# Patient Record
Sex: FEMALE | Race: WHITE | Employment: UNEMPLOYED | URBAN - METROPOLITAN AREA
[De-identification: names, ages, dates, MRNs, and addresses within clinical notes are randomized per-mention and may not be internally consistent; named-entity substitution may affect disease eponyms.]

---

## 2017-03-14 ENCOUNTER — OFFICE VISIT (OUTPATIENT)
Dept: SURGERY | Facility: CLINIC | Age: 52
End: 2017-03-14

## 2017-03-14 VITALS
WEIGHT: 162.63 LBS | HEIGHT: 62 IN | SYSTOLIC BLOOD PRESSURE: 130 MMHG | HEART RATE: 94 BPM | RESPIRATION RATE: 20 BRPM | BODY MASS INDEX: 29.93 KG/M2 | DIASTOLIC BLOOD PRESSURE: 77 MMHG

## 2017-03-14 DIAGNOSIS — E66.3 OVERWEIGHT (BMI 25.0-29.9): ICD-10-CM

## 2017-03-14 DIAGNOSIS — E78.2 MIXED HYPERCHOLESTEROLEMIA AND HYPERTRIGLYCERIDEMIA: ICD-10-CM

## 2017-03-14 DIAGNOSIS — F43.9 STRESS: ICD-10-CM

## 2017-03-14 DIAGNOSIS — R60.0 BILATERAL EDEMA OF LOWER EXTREMITY: ICD-10-CM

## 2017-03-14 DIAGNOSIS — R12 HEART BURN: Primary | ICD-10-CM

## 2017-03-14 DIAGNOSIS — E55.9 VITAMIN D DEFICIENCY: ICD-10-CM

## 2017-03-14 DIAGNOSIS — G47.33 OSA (OBSTRUCTIVE SLEEP APNEA): ICD-10-CM

## 2017-03-14 PROCEDURE — 99203 OFFICE O/P NEW LOW 30 MIN: CPT | Performed by: INTERNAL MEDICINE

## 2017-03-14 RX ORDER — HYDROCHLOROTHIAZIDE 12.5 MG/1
12.5 CAPSULE, GELATIN COATED ORAL DAILY
Qty: 30 CAPSULE | Refills: 1 | Status: SHIPPED | OUTPATIENT
Start: 2017-03-14 | End: 2017-09-21

## 2017-03-14 NOTE — PROGRESS NOTES
[facility] Consultation Note       Date of Consult:  3/14/2017    Patient:  Harriet Champion  :      [birth year]  MRN:      [id number]    Referring Provider: Dr. Beltran ref.  provider found       Chief Complaint:  Patient presents with: mg by mouth daily.  Disp:  Rfl:        Allergies:  Penicillins     Comorbidities:  GERD    Social History:      Social History   Marital Status:   Spouse Name: N/A    Years of Education: N/A  Number of Children: N/A     Occupational History  None on were reviewed and are negative.     Physical Exam:  General appearance: alert, appears stated age and cooperative  Head: Normocephalic, without obvious abnormality, atraumatic  Lungs: clear to auscultation bilaterally  Heart: S1, S2 normal, no murmur, click per day. 4. Increase fruit and vegetable servings to 5-6 per day. LENNOX: will start hctz 12.5 mg    Binge eating:   Will start vyvanse 20 mg  Eats when not hungry  Guilty feeling afterwards    Needs ekg        [unfilled]

## 2017-09-21 ENCOUNTER — OFFICE VISIT (OUTPATIENT)
Dept: SURGERY | Facility: CLINIC | Age: 52
End: 2017-09-21

## 2017-09-21 VITALS
SYSTOLIC BLOOD PRESSURE: 123 MMHG | RESPIRATION RATE: 16 BRPM | HEIGHT: 62 IN | WEIGHT: 169.31 LBS | BODY MASS INDEX: 31.16 KG/M2 | HEART RATE: 114 BPM | OXYGEN SATURATION: 95 % | DIASTOLIC BLOOD PRESSURE: 74 MMHG

## 2017-09-21 DIAGNOSIS — E55.9 VITAMIN D DEFICIENCY: ICD-10-CM

## 2017-09-21 DIAGNOSIS — R12 HEART BURN: ICD-10-CM

## 2017-09-21 DIAGNOSIS — E66.3 OVERWEIGHT (BMI 25.0-29.9): ICD-10-CM

## 2017-09-21 DIAGNOSIS — G47.33 OSA (OBSTRUCTIVE SLEEP APNEA): ICD-10-CM

## 2017-09-21 DIAGNOSIS — F41.9 ANXIETY: ICD-10-CM

## 2017-09-21 DIAGNOSIS — F43.9 STRESS: ICD-10-CM

## 2017-09-21 DIAGNOSIS — E78.2 MIXED HYPERCHOLESTEROLEMIA AND HYPERTRIGLYCERIDEMIA: Primary | ICD-10-CM

## 2017-09-21 PROCEDURE — 99214 OFFICE O/P EST MOD 30 MIN: CPT | Performed by: INTERNAL MEDICINE

## 2017-09-21 RX ORDER — BUPROPION HYDROCHLORIDE 150 MG/1
TABLET ORAL
Refills: 2 | COMMUNITY
Start: 2017-09-08 | End: 2020-07-07

## 2017-09-21 NOTE — PROGRESS NOTES
[address], [place] [address] [facility] 91 [facility] [address]  Dept: [phone]     Date:   2017    Patient:  Chandan Salvador  :      89  MRN:      [id number]    Chief Complain Marital status:   Spouse name: N/A    Years of education: N/A  Number of children: N/A     Occupational History  None on file     Social History Main Topics   Smoking status: Not on file    Smokeless tobacco: Not on file    Alcohol use Not on file Psychiatric/Behavioral: The patient is nervous/anxious and is hyperactive. Physical Exam:   Physical Exam   Constitutional: She is oriented to person, place, and time. She appears well-developed and well-nourished.    HENT:   Head: Normocephalic an anti-reflux medications. Patient is anxious- she states that she took a double dose of Wellbutrin on Wednesday and has been anxious ever since. Patient states that she wants to wean off wellbutrin.     Patient is not interested in medications- she did n

## 2017-11-02 ENCOUNTER — OFFICE VISIT (OUTPATIENT)
Dept: SURGERY | Facility: CLINIC | Age: 52
End: 2017-11-02

## 2017-11-02 VITALS
SYSTOLIC BLOOD PRESSURE: 107 MMHG | WEIGHT: 165.31 LBS | HEIGHT: 62 IN | OXYGEN SATURATION: 95 % | DIASTOLIC BLOOD PRESSURE: 67 MMHG | BODY MASS INDEX: 30.42 KG/M2 | HEART RATE: 84 BPM | RESPIRATION RATE: 16 BRPM

## 2017-11-02 DIAGNOSIS — R12 HEART BURN: ICD-10-CM

## 2017-11-02 DIAGNOSIS — G47.33 OSA (OBSTRUCTIVE SLEEP APNEA): ICD-10-CM

## 2017-11-02 DIAGNOSIS — E66.3 OVERWEIGHT (BMI 25.0-29.9): ICD-10-CM

## 2017-11-02 DIAGNOSIS — Z51.81 ENCOUNTER FOR THERAPEUTIC DRUG MONITORING: ICD-10-CM

## 2017-11-02 DIAGNOSIS — K76.0 FATTY LIVER: ICD-10-CM

## 2017-11-02 DIAGNOSIS — E78.2 MIXED HYPERCHOLESTEROLEMIA AND HYPERTRIGLYCERIDEMIA: Primary | ICD-10-CM

## 2017-11-02 DIAGNOSIS — E55.9 VITAMIN D DEFICIENCY: ICD-10-CM

## 2017-11-02 DIAGNOSIS — R73.03 PRE-DIABETES: ICD-10-CM

## 2017-11-02 PROCEDURE — 99214 OFFICE O/P EST MOD 30 MIN: CPT | Performed by: INTERNAL MEDICINE

## 2017-11-02 RX ORDER — MONTELUKAST SODIUM 10 MG/1
10 TABLET ORAL
COMMUNITY
Start: 2017-10-30 | End: 2018-02-08

## 2017-11-02 RX ORDER — LACTOBACIL 2/BIFIDO 1/S.THERMO 450B CELL
1 PACKET (EA) ORAL DAILY
Qty: 30 CAPSULE | Refills: 2 | Status: SHIPPED | OUTPATIENT
Start: 2017-11-02 | End: 2018-02-08

## 2017-11-02 NOTE — PROGRESS NOTES
[address] [place] [address] Sarah  [facility] 91 [facility]   Dept: [phone]     Date:   2017    Patient:  Marj Barber  :        MRN:      [id number]    Chief Complain units Oral Cap Take 5,000 Units by mouth daily. Disp:  Rfl: 5   Amitriptyline HCl 10 MG Oral Tab Take 10 mg by mouth daily. Disp:  Rfl: 0   aspirin 81 MG Oral Chew Tab Chew 81 mg by mouth daily.  Disp:  Rfl:      Allergies:  Penicillins     Social History: manage cues    Exercise Goals Reviewed and Discussed    Walk for  45 Minutes    ROS:    Review of Systems   Constitutional: Negative. HENT: Negative. Respiratory: Negative. Cardiovascular: Negative. Gastrointestinal: Negative.     Frutoso Poll meal plan and medication. Liver function stable. Reviewed Lipid panel- cholesterol and triglycerides are increased    GERD: The patient believes her reflux is somewhat better of at least no worse on current medication.  She was encouraged to avoid caffei

## 2017-12-08 ENCOUNTER — OFFICE VISIT (OUTPATIENT)
Dept: SURGERY | Facility: CLINIC | Age: 52
End: 2017-12-08

## 2017-12-08 VITALS
OXYGEN SATURATION: 96 % | RESPIRATION RATE: 18 BRPM | HEIGHT: 62 IN | SYSTOLIC BLOOD PRESSURE: 121 MMHG | HEART RATE: 86 BPM | DIASTOLIC BLOOD PRESSURE: 59 MMHG | BODY MASS INDEX: 29.01 KG/M2 | TEMPERATURE: 98 F | WEIGHT: 157.63 LBS

## 2017-12-08 DIAGNOSIS — E66.3 OVERWEIGHT (BMI 25.0-29.9): ICD-10-CM

## 2017-12-08 DIAGNOSIS — F43.9 STRESS: ICD-10-CM

## 2017-12-08 DIAGNOSIS — E78.2 MIXED HYPERCHOLESTEROLEMIA AND HYPERTRIGLYCERIDEMIA: Primary | ICD-10-CM

## 2017-12-08 DIAGNOSIS — K76.0 FATTY LIVER: ICD-10-CM

## 2017-12-08 DIAGNOSIS — G47.33 OSA (OBSTRUCTIVE SLEEP APNEA): ICD-10-CM

## 2017-12-08 DIAGNOSIS — Z51.81 ENCOUNTER FOR THERAPEUTIC DRUG MONITORING: ICD-10-CM

## 2017-12-08 PROCEDURE — 99214 OFFICE O/P EST MOD 30 MIN: CPT | Performed by: INTERNAL MEDICINE

## 2017-12-08 NOTE — PROGRESS NOTES
[address], [facility]  181 [facility] 91 [facility] [address]  Dept: [phone]     Date:   2017    Patient:  Christopher Tiwari  :      6697  MRN:      [id number]    Chief Complain (VITAMIN D3) 5000 units Oral Cap Take 5,000 Units by mouth daily. Disp:  Rfl: 5   Amitriptyline HCl 10 MG Oral Tab Take 10 mg by mouth daily. Disp:  Rfl: 0   aspirin 81 MG Oral Chew Tab Chew 81 mg by mouth daily.  Disp:  Rfl:      Allergies:  Penicillins for eating and manage cues    Exercise Goals Reviewed and Discussed    Walk for  45 Minutes    ROS:    Review of Systems   Constitutional: Negative. HENT: Negative. Respiratory: Negative. Cardiovascular: Negative. Gastrointestinal: Negative. medication. Liver function stable. Reviewed Lipid panel- cholesterol and triglycerides are increased    GERD: The patient believes her reflux is somewhat better of at least no worse on current medication.  She was encouraged to avoid caffeine products, e

## 2018-02-08 ENCOUNTER — OFFICE VISIT (OUTPATIENT)
Dept: SURGERY | Facility: CLINIC | Age: 53
End: 2018-02-08

## 2018-02-08 VITALS
OXYGEN SATURATION: 96 % | DIASTOLIC BLOOD PRESSURE: 71 MMHG | HEART RATE: 87 BPM | SYSTOLIC BLOOD PRESSURE: 121 MMHG | HEIGHT: 62 IN | BODY MASS INDEX: 28.53 KG/M2 | WEIGHT: 155.06 LBS | RESPIRATION RATE: 16 BRPM

## 2018-02-08 DIAGNOSIS — E55.9 VITAMIN D DEFICIENCY: ICD-10-CM

## 2018-02-08 DIAGNOSIS — Z51.81 ENCOUNTER FOR THERAPEUTIC DRUG MONITORING: ICD-10-CM

## 2018-02-08 DIAGNOSIS — R73.03 PRE-DIABETES: ICD-10-CM

## 2018-02-08 DIAGNOSIS — E78.2 MIXED HYPERCHOLESTEROLEMIA AND HYPERTRIGLYCERIDEMIA: Primary | ICD-10-CM

## 2018-02-08 DIAGNOSIS — G47.33 OSA (OBSTRUCTIVE SLEEP APNEA): ICD-10-CM

## 2018-02-08 DIAGNOSIS — K76.0 FATTY LIVER: ICD-10-CM

## 2018-02-08 DIAGNOSIS — E66.3 OVERWEIGHT (BMI 25.0-29.9): ICD-10-CM

## 2018-02-08 PROCEDURE — 99214 OFFICE O/P EST MOD 30 MIN: CPT | Performed by: INTERNAL MEDICINE

## 2018-02-08 RX ORDER — LACTOBACIL 2/BIFIDO 1/S.THERMO 450B CELL
1 PACKET (EA) ORAL DAILY
Qty: 90 CAPSULE | Refills: 2 | Status: SHIPPED | OUTPATIENT
Start: 2018-02-08 | End: 2019-02-24

## 2018-02-08 NOTE — PROGRESS NOTES
[address], [address] 91 [facility] [address]  Dept: [phone]     Date:   2017    Patient:  Yolanda Moya  :      2410  MRN:      [id number]    Chief Complain of education: N/A  Number of children: N/A     Occupational History  None on file     Social History Main Topics   Smoking status: Never Smoker    Smokeless tobacco: Never Used    Alcohol use Not on file    Drug use: No    Sexual activity: Not on file Physical Exam:   Physical Exam   Constitutional: She is oriented to person, place, and time. She appears well-developed and well-nourished. HENT:   Head: Normocephalic and atraumatic. Neck: Normal range of motion. Neck supple.    Cardiovascular: N caffeine products, elevated head of bed and not eat for 1 hour before bedtime. No interval changes were made in her anti-reflux medications. NON SUICIDAL DEPRESSION: Mood stable on current medication. No changes initiated on this visit.     Patient is n

## 2018-02-15 ENCOUNTER — LAB ENCOUNTER (OUTPATIENT)
Dept: LAB | Age: 53
End: 2018-02-15
Attending: INTERNAL MEDICINE
Payer: COMMERCIAL

## 2018-02-15 DIAGNOSIS — G47.33 OSA (OBSTRUCTIVE SLEEP APNEA): ICD-10-CM

## 2018-02-15 DIAGNOSIS — R73.03 PRE-DIABETES: ICD-10-CM

## 2018-02-15 DIAGNOSIS — E66.3 OVERWEIGHT (BMI 25.0-29.9): ICD-10-CM

## 2018-02-15 DIAGNOSIS — E55.9 VITAMIN D DEFICIENCY: ICD-10-CM

## 2018-02-15 DIAGNOSIS — Z51.81 ENCOUNTER FOR THERAPEUTIC DRUG MONITORING: ICD-10-CM

## 2018-02-15 DIAGNOSIS — K76.0 FATTY LIVER: ICD-10-CM

## 2018-02-15 DIAGNOSIS — E78.2 MIXED HYPERCHOLESTEROLEMIA AND HYPERTRIGLYCERIDEMIA: ICD-10-CM

## 2018-02-15 LAB
ALBUMIN SERPL BCP-MCNC: 4.3 G/DL (ref 3.5–4.8)
ALBUMIN/GLOB SERPL: 1.3 {RATIO} (ref 1–2)
ALP SERPL-CCNC: 73 U/L (ref 32–100)
ALT SERPL-CCNC: 102 U/L (ref 14–54)
ANION GAP SERPL CALC-SCNC: 9 MMOL/L (ref 0–18)
AST SERPL-CCNC: 55 U/L (ref 15–41)
BASOPHILS # BLD: 0 K/UL (ref 0–0.2)
BASOPHILS NFR BLD: 1 %
BILIRUB SERPL-MCNC: 0.4 MG/DL (ref 0.3–1.2)
BUN SERPL-MCNC: 17 MG/DL (ref 8–20)
BUN/CREAT SERPL: 21.8 (ref 10–20)
CALCIUM SERPL-MCNC: 9.4 MG/DL (ref 8.5–10.5)
CHLORIDE SERPL-SCNC: 101 MMOL/L (ref 95–110)
CHOLEST SERPL-MCNC: 216 MG/DL (ref 110–200)
CO2 SERPL-SCNC: 27 MMOL/L (ref 22–32)
CREAT SERPL-MCNC: 0.78 MG/DL (ref 0.5–1.5)
EOSINOPHIL # BLD: 0.1 K/UL (ref 0–0.7)
EOSINOPHIL NFR BLD: 1 %
ERYTHROCYTE [DISTWIDTH] IN BLOOD BY AUTOMATED COUNT: 14 % (ref 11–15)
GLOBULIN PLAS-MCNC: 3.4 G/DL (ref 2.5–3.7)
GLUCOSE SERPL-MCNC: 101 MG/DL (ref 70–99)
HCT VFR BLD AUTO: 39.4 % (ref 35–48)
HDLC SERPL-MCNC: 42 MG/DL
HGB BLD-MCNC: 13.3 G/DL (ref 12–16)
LDLC SERPL CALC-MCNC: 138 MG/DL (ref 0–99)
LYMPHOCYTES # BLD: 2.4 K/UL (ref 1–4)
LYMPHOCYTES NFR BLD: 34 %
MCH RBC QN AUTO: 27.3 PG (ref 27–32)
MCHC RBC AUTO-ENTMCNC: 33.7 G/DL (ref 32–37)
MCV RBC AUTO: 80.8 FL (ref 80–100)
MONOCYTES # BLD: 0.6 K/UL (ref 0–1)
MONOCYTES NFR BLD: 8 %
NEUTROPHILS # BLD AUTO: 4 K/UL (ref 1.8–7.7)
NEUTROPHILS NFR BLD: 56 %
NONHDLC SERPL-MCNC: 174 MG/DL
OSMOLALITY UR CALC.SUM OF ELEC: 286 MOSM/KG (ref 275–295)
PATIENT FASTING: YES
PLATELET # BLD AUTO: 193 K/UL (ref 140–400)
PMV BLD AUTO: 7.8 FL (ref 7.4–10.3)
POTASSIUM SERPL-SCNC: 4.3 MMOL/L (ref 3.3–5.1)
PROT SERPL-MCNC: 7.7 G/DL (ref 5.9–8.4)
RBC # BLD AUTO: 4.88 M/UL (ref 3.7–5.4)
SODIUM SERPL-SCNC: 137 MMOL/L (ref 136–144)
TRIGL SERPL-MCNC: 182 MG/DL (ref 1–149)
TSH SERPL-ACNC: 1.98 UIU/ML (ref 0.45–5.33)
VIT B12 SERPL-MCNC: 535 PG/ML (ref 181–914)
WBC # BLD AUTO: 7.1 K/UL (ref 4–11)

## 2018-02-15 PROCEDURE — 83036 HEMOGLOBIN GLYCOSYLATED A1C: CPT

## 2018-02-15 PROCEDURE — 82607 VITAMIN B-12: CPT

## 2018-02-15 PROCEDURE — 36415 COLL VENOUS BLD VENIPUNCTURE: CPT

## 2018-02-15 PROCEDURE — 80061 LIPID PANEL: CPT

## 2018-02-15 PROCEDURE — 82306 VITAMIN D 25 HYDROXY: CPT

## 2018-02-15 PROCEDURE — 84443 ASSAY THYROID STIM HORMONE: CPT

## 2018-02-15 PROCEDURE — 85025 COMPLETE CBC W/AUTO DIFF WBC: CPT

## 2018-02-15 PROCEDURE — 80053 COMPREHEN METABOLIC PANEL: CPT

## 2018-02-16 LAB
25(OH)D3 SERPL-MCNC: 73.6 NG/ML
HBA1C MFR BLD: 5.7 % (ref 4–6)

## 2018-02-20 ENCOUNTER — TELEPHONE (OUTPATIENT)
Dept: SURGERY | Facility: CLINIC | Age: 53
End: 2018-02-20

## 2018-04-06 ENCOUNTER — OFFICE VISIT (OUTPATIENT)
Dept: SURGERY | Facility: CLINIC | Age: 53
End: 2018-04-06

## 2018-04-06 VITALS
OXYGEN SATURATION: 95 % | RESPIRATION RATE: 16 BRPM | HEART RATE: 81 BPM | WEIGHT: 155 LBS | DIASTOLIC BLOOD PRESSURE: 67 MMHG | BODY MASS INDEX: 28.52 KG/M2 | HEIGHT: 62 IN | SYSTOLIC BLOOD PRESSURE: 109 MMHG

## 2018-04-06 DIAGNOSIS — E78.2 MIXED HYPERCHOLESTEROLEMIA AND HYPERTRIGLYCERIDEMIA: Primary | ICD-10-CM

## 2018-04-06 DIAGNOSIS — R73.03 PRE-DIABETES: ICD-10-CM

## 2018-04-06 DIAGNOSIS — G47.33 OSA (OBSTRUCTIVE SLEEP APNEA): ICD-10-CM

## 2018-04-06 DIAGNOSIS — Z51.81 ENCOUNTER FOR THERAPEUTIC DRUG MONITORING: ICD-10-CM

## 2018-04-06 DIAGNOSIS — F43.9 STRESS: ICD-10-CM

## 2018-04-06 DIAGNOSIS — E66.3 OVERWEIGHT (BMI 25.0-29.9): ICD-10-CM

## 2018-04-06 PROCEDURE — 99214 OFFICE O/P EST MOD 30 MIN: CPT | Performed by: INTERNAL MEDICINE

## 2018-04-06 RX ORDER — TRAZODONE HYDROCHLORIDE 50 MG/1
50 TABLET ORAL NIGHTLY
COMMUNITY

## 2018-04-06 NOTE — PROGRESS NOTES
[address], [place] [address] Claire Smith  [facility] 91 [facility] [address]  Dept: [phone]     Date:   2017    Patient:  Gaviota Hook  :      [birth year]  MRN:      [id number]    Chief Complain status:   Spouse name: N/A    Years of education: N/A  Number of children: N/A     Occupational History  None on file     Social History Main Topics   Smoking status: Never Smoker    Smokeless tobacco: Never Used    Alcohol use Not on file    Drug u nervous/anxious. The patient is not hyperactive. Physical Exam:   Physical Exam   Constitutional: She is oriented to person, place, and time. She appears well-developed and well-nourished. HENT:   Head: Normocephalic and atraumatic.    Neck: Normal medications. NON SUICIDAL DEPRESSION: Mood stable on current medication. No changes initiated on this visit. Pre diabetes: a1C is 5.7. Aware she needs to cut back on carb intake    Goals for next month:  1. Keep a food log.   2. Drink 48-64 ounces

## 2018-04-18 ENCOUNTER — TELEPHONE (OUTPATIENT)
Dept: SURGERY | Facility: CLINIC | Age: 53
End: 2018-04-18

## 2018-04-18 NOTE — TELEPHONE ENCOUNTER
4/11/18 @ 10:38am Spoke to Vivian at [facility], [phone], TBW#[id number]. She verified that patient has following benefits for Bariatric services: [employer] (ALICE#[id number]) DX E66.3 Pt has benefits for Dietitian ([id number]/49920) no limit,  no prior auth reem’norman.  Carmencita [facility]

## 2018-04-26 ENCOUNTER — OFFICE VISIT (OUTPATIENT)
Dept: SURGERY | Facility: CLINIC | Age: 53
End: 2018-04-26

## 2018-04-26 VITALS — WEIGHT: 157.38 LBS | HEIGHT: 62 IN | BODY MASS INDEX: 28.96 KG/M2

## 2018-04-26 DIAGNOSIS — E66.3 OVERWEIGHT (BMI 25.0-29.9): ICD-10-CM

## 2018-04-26 PROCEDURE — 97802 MEDICAL NUTRITION INDIV IN: CPT | Performed by: DIETITIAN, REGISTERED

## 2018-04-26 NOTE — PROGRESS NOTES
[address] [place] [address] AND WEIGHT LOSS CLINIC  [address] [place] [address]  Dept: [phone]  Loc: [phone]    04/26/18    Bariatric Initial Nutrition Assessment    Juanita Mascorro is a 47year old female. constipation    Allergies:    Penicillins             Rash    Height:  Ht Readings from Last 1 Encounters:  04/26/18 : 5' 2\" (1.575 m)      Weight:  Wt Readings from Last 6 Encounters:  04/26/18 : 157 lb 6.4 oz  04/06/18 : 155 lb  02/08/18 : 155 lb 0.9 oz nightly., Disp: , Rfl:   •  Lisdexamfetamine Dimesylate (VYVANSE) 20 MG Oral Cap, Take 1 capsule (20 mg total) by mouth daily. , Disp: 30 capsule, Rfl: 0  •  [START ON 5/6/2018] Lisdexamfetamine Dimesylate (VYVANSE) 20 MG Oral Cap, Take 1 capsule (20 mg tot simple sugars  Inadequate in:  protein, vegetables and fiber   Trigger Foods: pop, popcorn  Patient currently consumes caffeine: more often than 3 times/week  Patient currently consumes soda: more often than 3 times/week    Activity Level: minimal and sede intake/choices  · Nutrition Rx  · Anthropometric measurements  · Body composition-InBody Testing at next appointment per MD recommendation  · Updated labs  · F/U MD plan of care  · F/U to reinforce goals  · F/U on vitamin/mineral supplementation  · Other:

## 2018-06-07 ENCOUNTER — OFFICE VISIT (OUTPATIENT)
Dept: SURGERY | Facility: CLINIC | Age: 53
End: 2018-06-07

## 2018-06-07 VITALS — BODY MASS INDEX: 29.5 KG/M2 | HEIGHT: 62 IN | WEIGHT: 160.31 LBS

## 2018-06-07 DIAGNOSIS — E66.3 OVERWEIGHT (BMI 25.0-29.9): ICD-10-CM

## 2018-06-07 PROCEDURE — 0358T BIA WHOLE BODY: CPT | Performed by: DIETITIAN, REGISTERED

## 2018-06-07 PROCEDURE — 97803 MED NUTRITION INDIV SUBSEQ: CPT | Performed by: DIETITIAN, REGISTERED

## 2018-06-07 NOTE — PROGRESS NOTES
[address]  [address] [address]  Dept: [phone]  Loc: [phone]    06/07/18      Bariatric Follow-up Nutrition Session    Sharri Mann is a 47year old female. (TRINTELLIX) 5 MG Oral Tab, Take 1 tablet by mouth daily. , Disp: , Rfl:   •  TraZODone HCl 50 MG Oral Tab, Take 50 mg by mouth nightly., Disp: , Rfl:   •  Probiotic Product (VSL#3) Oral Cap, Take 1 capsule by mouth daily. , Disp: 90 capsule, Rfl: 2  •  lans nutrition recommendations discussed last visit. Patient continues to struggle with inconsistent sleep schedule lending night eating. Not taking Vyvanse due to preexisting sleep disturbance.  Reports depressing home environment impedes any positive behavior

## 2018-07-24 ENCOUNTER — OFFICE VISIT (OUTPATIENT)
Dept: SURGERY | Facility: CLINIC | Age: 53
End: 2018-07-24

## 2018-07-24 VITALS — BODY MASS INDEX: 28.65 KG/M2 | WEIGHT: 155.69 LBS | HEIGHT: 62 IN

## 2018-07-24 DIAGNOSIS — E66.3 OVERWEIGHT (BMI 25.0-29.9): ICD-10-CM

## 2018-07-24 PROCEDURE — 97803 MED NUTRITION INDIV SUBSEQ: CPT | Performed by: DIETITIAN, REGISTERED

## 2018-07-24 NOTE — PROGRESS NOTES
[address]  [address] [address]  Dept: [phone]  Loc: [phone]    07/24/18      Bariatric Follow-up Nutrition Session    Leeanna Maloneya is a 47year old female. tablet by mouth daily. , Disp: , Rfl:   •  TraZODone HCl 50 MG Oral Tab, Take 50 mg by mouth nightly., Disp: , Rfl:   •  Probiotic Product (VSL#3) Oral Cap, Take 1 capsule by mouth daily. , Disp: 90 capsule, Rfl: 2  •  lansoprazole 30 MG Oral Capsule Delayed returning from trip to [facility] which pt attributes to frequently walking. Feeling better about restarting her lifestyle habits with more energy. Patient states her sleep habits have also improved.  Planning to grocery shop to  healthy foods for the tristin

## 2018-08-27 ENCOUNTER — TELEPHONE (OUTPATIENT)
Dept: SURGERY | Facility: CLINIC | Age: 53
End: 2018-08-27

## 2018-09-10 ENCOUNTER — OFFICE VISIT (OUTPATIENT)
Dept: SURGERY | Facility: CLINIC | Age: 53
End: 2018-09-10
Payer: COMMERCIAL

## 2018-09-10 VITALS
HEIGHT: 62 IN | WEIGHT: 151.06 LBS | RESPIRATION RATE: 18 BRPM | OXYGEN SATURATION: 98 % | HEART RATE: 79 BPM | DIASTOLIC BLOOD PRESSURE: 66 MMHG | SYSTOLIC BLOOD PRESSURE: 118 MMHG | BODY MASS INDEX: 27.8 KG/M2

## 2018-09-10 DIAGNOSIS — G47.33 OSA (OBSTRUCTIVE SLEEP APNEA): Primary | ICD-10-CM

## 2018-09-10 DIAGNOSIS — F43.9 STRESS: ICD-10-CM

## 2018-09-10 DIAGNOSIS — E66.3 OVERWEIGHT (BMI 25.0-29.9): ICD-10-CM

## 2018-09-10 DIAGNOSIS — E78.2 MIXED HYPERCHOLESTEROLEMIA AND HYPERTRIGLYCERIDEMIA: ICD-10-CM

## 2018-09-10 DIAGNOSIS — R73.03 PRE-DIABETES: ICD-10-CM

## 2018-09-10 DIAGNOSIS — R12 HEART BURN: ICD-10-CM

## 2018-09-10 DIAGNOSIS — Z51.81 ENCOUNTER FOR THERAPEUTIC DRUG MONITORING: ICD-10-CM

## 2018-09-10 PROCEDURE — 99214 OFFICE O/P EST MOD 30 MIN: CPT | Performed by: INTERNAL MEDICINE

## 2018-09-10 NOTE — PROGRESS NOTES
[address] [place] [address] Sarah  [facility] 91 [facility] [address]  Dept: [phone]     Date:   2017    Patient:  Harriet Champion  :        MRN:      [id number]    Chief Complain status:       Spouse name: Not on file      Number of children: Not on file      Years of education: Not on file      Highest education level: Not on file    Social Needs      Financial resource strain: Not on file      Food insecurity - worry: Not Identify triggers for eating and manage cues    Exercise Goals Reviewed and Discussed    Walk for  45 Minutes    ROS:    Review of Systems   Constitutional: Negative. HENT: Negative. Respiratory: Negative. Cardiovascular: Negative.     Gastrointest the above prescribed meal plan and medication. Liver function stable. Reviewed Lipid panel- cholesterol and triglycerides are increased    GERD: The patient believes her reflux is somewhat better of at least no worse on current medication.  She was encou

## 2018-10-04 ENCOUNTER — OFFICE VISIT (OUTPATIENT)
Dept: SURGERY | Facility: CLINIC | Age: 53
End: 2018-10-04
Payer: COMMERCIAL

## 2018-10-04 VITALS — BODY MASS INDEX: 28.09 KG/M2 | HEIGHT: 62 IN | WEIGHT: 152.63 LBS

## 2018-10-04 DIAGNOSIS — E66.3 OVERWEIGHT (BMI 25.0-29.9): Primary | ICD-10-CM

## 2018-10-04 DIAGNOSIS — R73.03 PRE-DIABETES: ICD-10-CM

## 2018-10-04 PROCEDURE — 97803 MED NUTRITION INDIV SUBSEQ: CPT | Performed by: DIETITIAN, REGISTERED

## 2018-10-04 NOTE — PROGRESS NOTES
[address] [facility] WE[facility]IN[facility]  [address] [place] [address]  Dept: [phone]  Loc: [phone]    10/04/18      Bariatric Follow-up Nutrition Session    Antonio Sandoval is a 47year old female. daily., Disp: 30 capsule, Rfl: 0  •  Vortioxetine HBr (TRINTELLIX) 5 MG Oral Tab, Take 1 tablet by mouth daily. , Disp: , Rfl:   •  TraZODone HCl 50 MG Oral Tab, Take 50 mg by mouth nightly., Disp: , Rfl:   •  Probiotic Product (VSL#3) Oral Cap, Take 1 caps be more structured. Describes some difficulties with family supporting healthy lifestyle so is now focusing more on herself. Used to do all cooking ([facility]) from scratch.  Needs to focus on consistent protein (non-lactose) and make moving more of a priorit

## 2019-02-25 RX ORDER — LACTOBACIL 2/BIFIDO 1/S.THERMO 450B CELL
1 PACKET (EA) ORAL DAILY
Qty: 90 CAPSULE | Refills: 0 | Status: SHIPPED | OUTPATIENT
Start: 2019-02-25 | End: 2020-07-07

## 2019-07-01 PROBLEM — G43.009 MIGRAINE WITHOUT AURA: Status: ACTIVE | Noted: 2019-03-06

## 2020-07-07 ENCOUNTER — OFFICE VISIT (OUTPATIENT)
Dept: INTERNAL MEDICINE CLINIC | Facility: CLINIC | Age: 55
End: 2020-07-07
Payer: COMMERCIAL

## 2020-07-07 ENCOUNTER — TELEPHONE (OUTPATIENT)
Dept: INTERNAL MEDICINE CLINIC | Facility: CLINIC | Age: 55
End: 2020-07-07

## 2020-07-07 VITALS
HEIGHT: 62 IN | RESPIRATION RATE: 18 BRPM | DIASTOLIC BLOOD PRESSURE: 81 MMHG | SYSTOLIC BLOOD PRESSURE: 126 MMHG | BODY MASS INDEX: 29.44 KG/M2 | WEIGHT: 160 LBS | HEART RATE: 84 BPM

## 2020-07-07 DIAGNOSIS — R73.03 PRE-DIABETES: ICD-10-CM

## 2020-07-07 DIAGNOSIS — G43.009 MIGRAINE WITHOUT AURA AND WITHOUT STATUS MIGRAINOSUS, NOT INTRACTABLE: ICD-10-CM

## 2020-07-07 DIAGNOSIS — K76.0 FATTY LIVER: ICD-10-CM

## 2020-07-07 DIAGNOSIS — Z00.00 ROUTINE PHYSICAL EXAMINATION: ICD-10-CM

## 2020-07-07 DIAGNOSIS — G93.49 LEUKOENCEPHALOPATHY: ICD-10-CM

## 2020-07-07 DIAGNOSIS — F43.23 ADJUSTMENT DISORDER WITH MIXED ANXIETY AND DEPRESSED MOOD: ICD-10-CM

## 2020-07-07 DIAGNOSIS — Z15.89 HETEROZYGOUS MTHFR MUTATION C677T: Primary | ICD-10-CM

## 2020-07-07 DIAGNOSIS — E55.9 VITAMIN D DEFICIENCY: ICD-10-CM

## 2020-07-07 DIAGNOSIS — E78.2 MIXED HYPERCHOLESTEROLEMIA AND HYPERTRIGLYCERIDEMIA: ICD-10-CM

## 2020-07-07 DIAGNOSIS — G47.33 OSA (OBSTRUCTIVE SLEEP APNEA): ICD-10-CM

## 2020-07-07 PROCEDURE — 3079F DIAST BP 80-89 MM HG: CPT | Performed by: INTERNAL MEDICINE

## 2020-07-07 PROCEDURE — 3074F SYST BP LT 130 MM HG: CPT | Performed by: INTERNAL MEDICINE

## 2020-07-07 PROCEDURE — 3008F BODY MASS INDEX DOCD: CPT | Performed by: INTERNAL MEDICINE

## 2020-07-07 PROCEDURE — 99205 OFFICE O/P NEW HI 60 MIN: CPT | Performed by: INTERNAL MEDICINE

## 2020-07-07 RX ORDER — BUSPIRONE HYDROCHLORIDE 5 MG/1
5 TABLET ORAL
COMMUNITY
Start: 2019-09-18

## 2020-07-07 NOTE — ASSESSMENT & PLAN NOTE
Ongoing issues with migraines. Stable at this time. Monitored by neurology.   Will be followed up after the next MRI is completed

## 2020-07-07 NOTE — PROGRESS NOTES
HPI:    Patient ID: Geovanna Manley is a 48year old female. New Patient, here to establish care.      Patient with a past medical history of nonrheumatic aortic valve insufficiency, SVT–most likely due to MAI migraine without aura, fatty liver, quynh without any speech or visual deficits. Significant problems with anxiety, depression related to dealing with young child who is currently about 12years old with multiple learning disabilities and psychiatric problems as well as delusions.   Current medi Allergic/Immunologic: Negative. Neurological: Negative. Negative for weakness. Hematological: Negative. Psychiatric/Behavioral: Positive for behavioral problems, decreased concentration, dysphoric mood and sleep disturbance.  The patient is nervo General: No tenderness or edema. Lymphadenopathy:     She has no cervical adenopathy. Neurological: She is alert and oriented to person, place, and time. She has normal reflexes. No cranial nerve deficit. She exhibits normal muscle tone.  Coordination n at this time. Monitored by neurology.   Will be followed up after the next MRI is completed         Leukoencephalopathy     Follow-up after the next MRI, labs as ordered         Relevant Orders    FOLIC ACID SERUM(FOLATE)    SED EVA HORN (AUTOMATED

## 2020-07-07 NOTE — TELEPHONE ENCOUNTER
Pt dropped off results for MD to review. Placed in AP's mailbox at [facility].

## 2020-07-07 NOTE — PATIENT INSTRUCTIONS
Problem List Items Addressed This Visit        Unprioritized    Adjustment disorder with mixed anxiety and depressed mood    Fatty liver     Fatty liver changes, new patient here, history of liver abnormalities. Recheck labs ordered.          Heterozygous

## 2020-07-07 NOTE — ASSESSMENT & PLAN NOTE
MAI on CPAP, has been using her CPAP for the past 1 year on a regular basis. History of SVT–has not been an issue recently, has completed Holter monitor as well as an event monitor per cardiology at [facility].

## 2020-07-31 ENCOUNTER — TELEPHONE (OUTPATIENT)
Dept: INTERNAL MEDICINE CLINIC | Facility: CLINIC | Age: 55
End: 2020-07-31

## 2020-08-10 NOTE — ASSESSMENT & PLAN NOTE
Has been seen by psychiatry and counselling at [facility]. Goal setting for small changes in outlook as discused. daily exercise discussed. Struggles with coping with daughter with learning disability and husbands psychiatric needs.

## 2021-05-20 ENCOUNTER — TELEPHONE (OUTPATIENT)
Dept: INTERNAL MEDICINE CLINIC | Facility: CLINIC | Age: 56
End: 2021-05-20

## 2021-05-20 NOTE — TELEPHONE ENCOUNTER
Please contact patient and assist on scheduling appt with Dr. Beatriz Nieto.  Pt is due for for mammogram.

## 2021-05-20 NOTE — TELEPHONE ENCOUNTER
Per patient she had her Mammogram already in [place], told patient to send us a copy and she understood.

## 2022-01-12 ENCOUNTER — HOSPITAL ENCOUNTER (OUTPATIENT)
Age: 57
Discharge: HOME | End: 2022-01-12
Payer: COMMERCIAL

## 2022-01-12 VITALS
SYSTOLIC BLOOD PRESSURE: 130 MMHG | RESPIRATION RATE: 18 BRPM | TEMPERATURE: 98 F | DIASTOLIC BLOOD PRESSURE: 75 MMHG | HEART RATE: 85 BPM | OXYGEN SATURATION: 98 %

## 2022-01-12 DIAGNOSIS — B97.89 VIRAL RESPIRATORY ILLNESS: Primary | ICD-10-CM

## 2022-01-12 DIAGNOSIS — J98.8 VIRAL RESPIRATORY ILLNESS: Primary | ICD-10-CM

## 2022-01-12 LAB — SARS-COV-2 RNA RESP QL NAA+PROBE: NOT DETECTED

## 2022-01-12 PROCEDURE — 99212 OFFICE O/P EST SF 10 MIN: CPT | Performed by: NURSE PRACTITIONER

## 2022-01-12 PROCEDURE — U0002 COVID-19 LAB TEST NON-CDC: HCPCS | Performed by: NURSE PRACTITIONER

## 2022-01-12 NOTE — ED INITIAL ASSESSMENT (HPI)
Pt came in due to cough, congestion, and runny nose for the past few days. Pt denies any sob, cp, nvd, ha, fever, or any dizziness. Pt has easy non labored respirations. Pt is fully verbal and ambulatory.

## 2022-01-12 NOTE — ED PROVIDER NOTES
Patient Seen in: [facility]    History   CC: covid testing  HPI: Becca Klein 55year old female  who presents requesting covid testing after runny nose, congestion, sore throat, and generalized fatigue x7 days.  +vaccinated w/o booster NAD  Head - Appears symmetrical without deformity/swelling cranium, scalp, or facial bones  Eyes - sclera not injected, no discharge noted, no periorbital edema  ENT - EAC bilaterally without discharge, TM pearly grey with COL visualized appropriately bila

## (undated) NOTE — MR AVS SNAPSHOT
[facility] [place] [address] [phone]               Thank you for choosing us for your health care visit with Enid Walker MD.  We are glad to serve you and happy to provide you with this summary of your v ClonazePAM 0.5 MG Tabs   Take 0.5 mg by mouth 3 (three) times daily. Commonly known as:  KLONOPIN           escitalopram 20 MG Tabs   Take 20 mg by mouth daily.    Commonly known as:  LEXAPRO           hydrochlorothiazide 12.5 MG Caps   Take 1 capsule (1 not sign up before the expiration date, you must request a new code. Your unique [employer] Access Code: [email or web address]  Expires: 4/11/2017 12:27 PM    If you have questions, you can call [phone] to talk to our [facility] Staff.  Remember, [employer]

## (undated) NOTE — Clinical Note
3/14/2017    Patient: Camilla Hutchinson  : [id number] Visit date: 3/14/2017    Dear  Dr. Tee Bhakta is a pleasant but unfortunate 49year old,, female, who was referred to us for weight management recommendations.   Jace Bar is inte